# Patient Record
Sex: FEMALE | Race: OTHER | NOT HISPANIC OR LATINO
[De-identification: names, ages, dates, MRNs, and addresses within clinical notes are randomized per-mention and may not be internally consistent; named-entity substitution may affect disease eponyms.]

---

## 2020-04-26 ENCOUNTER — MESSAGE (OUTPATIENT)
Age: 55
End: 2020-04-26

## 2020-05-01 LAB
SARS-COV-2 IGG SERPL IA-ACNC: <0.1 INDEX
SARS-COV-2 IGG SERPL QL IA: NEGATIVE

## 2020-09-10 ENCOUNTER — TRANSCRIPTION ENCOUNTER (OUTPATIENT)
Age: 55
End: 2020-09-10

## 2020-10-08 PROBLEM — Z00.00 ENCOUNTER FOR PREVENTIVE HEALTH EXAMINATION: Status: ACTIVE | Noted: 2020-10-08

## 2021-03-16 PROBLEM — Z86.2 HISTORY OF ANEMIA: Status: RESOLVED | Noted: 2021-03-16 | Resolved: 2021-03-16

## 2021-03-16 PROBLEM — Z83.3 FAMILY HISTORY OF DIABETES MELLITUS: Status: ACTIVE | Noted: 2021-03-16

## 2021-03-16 PROBLEM — Z82.0 FAMILY HISTORY OF ALZHEIMER'S DISEASE: Status: ACTIVE | Noted: 2021-03-16

## 2021-03-16 PROBLEM — Z80.3 FAMILY HISTORY OF MALIGNANT NEOPLASM OF FEMALE BREAST: Status: ACTIVE | Noted: 2021-03-16

## 2021-03-17 ENCOUNTER — APPOINTMENT (OUTPATIENT)
Dept: RADIATION ONCOLOGY | Facility: CLINIC | Age: 56
End: 2021-03-17
Payer: COMMERCIAL

## 2021-03-17 VITALS
DIASTOLIC BLOOD PRESSURE: 85 MMHG | OXYGEN SATURATION: 100 % | SYSTOLIC BLOOD PRESSURE: 149 MMHG | HEART RATE: 100 BPM | WEIGHT: 123 LBS | HEIGHT: 60 IN | TEMPERATURE: 98 F | RESPIRATION RATE: 16 BRPM | BODY MASS INDEX: 24.15 KG/M2

## 2021-03-17 DIAGNOSIS — Z83.3 FAMILY HISTORY OF DIABETES MELLITUS: ICD-10-CM

## 2021-03-17 DIAGNOSIS — Z86.2 PERSONAL HISTORY OF DISEASES OF THE BLOOD AND BLOOD-FORMING ORGANS AND CERTAIN DISORDERS INVOLVING THE IMMUNE MECHANISM: ICD-10-CM

## 2021-03-17 DIAGNOSIS — Z80.3 FAMILY HISTORY OF MALIGNANT NEOPLASM OF BREAST: ICD-10-CM

## 2021-03-17 DIAGNOSIS — Z82.0 FAMILY HISTORY OF EPILEPSY AND OTHER DISEASES OF THE NERVOUS SYSTEM: ICD-10-CM

## 2021-03-17 PROCEDURE — 99072 ADDL SUPL MATRL&STAF TM PHE: CPT

## 2021-03-17 PROCEDURE — 99204 OFFICE O/P NEW MOD 45 MIN: CPT

## 2021-03-17 RX ORDER — MIRTAZAPINE 7.5 MG/1
7.5 TABLET, FILM COATED ORAL
Refills: 0 | Status: DISCONTINUED | COMMUNITY
End: 2021-03-17

## 2021-03-17 RX ORDER — DULOXETINE HYDROCHLORIDE 20 MG/1
20 CAPSULE, DELAYED RELEASE ORAL
Refills: 0 | Status: DISCONTINUED | COMMUNITY
End: 2021-03-17

## 2021-03-20 NOTE — HISTORY OF PRESENT ILLNESS
[FreeTextEntry1] : Mrs. Moni Turner is a 54 yo female who presents today for a newly diagnosed T4bN0 adenocarcinoma of the colon, s/p resection and chemotherapy. \par \par Mrs. Turner went for her annual H&P in 7/2020 and was given stool occult cards. The cards came back positive for occult blood and she was referred for a colonoscopy. On 9/1/2020 a colonoscopy was performed and revealed a tumor at the hepatic flexure which was biopsied and tattooed, as well as a 15 mm polyp at the appendiceal orifice which was also biopsied. Pathologic findings from hepatic flexure biopsy revealed findings highly suspicious for carcinoma possibly mucinous carcinoma. The appendiceal orifice biopsy showed adenomatous mucosa. \par \par On 9/2/2020 CT C/A/P  ( [facility]) revealed large ascending colon mass with extension into the right quadratus lumborum and possibly external oblique muscle with a small component extending through the posterior wall into the subcutaneous fat. No pathologically enlarged lymph nodes were demonstrated by CT criteria. Small mesenteric lymph nodes were demonstrated around the mass. Subcentimeter liver hypodensities were too small to characterize by CT but favored small cysts. An additional hypodensity was demonstrated adjacent to the falciform ligament in the typical location for focal fat.  There were several small nonspecific bilateral pulmonary nodules. \par \par On 9/3/2020 an MRI Abdomen was performed which revealed a few small liver cysts. No suspicious enhancing liver lesions. probable mild focal fatty infiltration liver adjacent to the falciform ligament. There was iron deposition in the liver and spleen, which may be related to multiple transfusions. The known large right ascending colon/hepatic flexure mass was again demonstrated.\par  \par On 9/8/2020 Mrs. Peng underwent a colon resection by Dr Hemphill and Dr. Bloomberg. Pathology revealed; a 7cm well differentiated mucinous adenocarcinoma with tumor extension through the muscularis propria into the pericolonic adipose tissue, there was involvement of the fibrous tissue near the skeletal muscle.  All other margins were negative. There was no evidence of lymphvascular or perineural invasion.  0/35 lymph nodes were positive for metastatic carcinoma.  MMR testing demonstrated intact nuclear expression.  pT4bN0.  She then began adjuvant chemotherapy under the care of Dr. Goldberg on 10/8/2020 with FOLFOX. Her last treatment was on 3/9/2021. She tolerated treatment well but did develop neuropathy in her hands and feet. \par \par Mrs. Liz presents today for consideration of adjuvant radiation to the positive margin.  Her most recent CT of the chest/abdomen/pelvis performed on 3/15/21 revealed no evidence of lesions in the chest/abdomen/pelvis.  There was a 5mm left lower lobe subpleural pulmonary nodule and follow up was recommended.  Mrs. Tariq states that she is feeling well. She denies any pain. Her biggest complaint is that she has neuropathy in both hands and feet. She denies any issues with urination or bowel movement. She has a good appetite and is hydrating well. She is interested in returning to work. \par

## 2021-03-20 NOTE — VITALS
[Maximal Pain Intensity: 0/10] : 0/10 [Least Pain Intensity: 0/10] : 0/10 [NoTreatment Scheduled] : no treatment scheduled [80: Normal activity with effort; some signs or symptoms of disease.] : 80: Normal activity with effort; some signs or symptoms of disease.  [ECOG Performance Status: 1 - Restricted in physically strenuous activity but ambulatory and able to carry out work of a light or sedentary nature] : Performance Status: 1 - Restricted in physically strenuous activity but ambulatory and able to carry out work of a light or sedentary nature, e.g., light house work, office work [Date: ____________] : Patient's last distress assessment performed on [unfilled]. [3 - Distress Level] : Distress Level: 3 [Referred Patient  to social work for follow-up] : Patient was referred to social work for follow-up [FreeTextEntry7] : discussed h & W

## 2021-03-20 NOTE — PHYSICAL EXAM
[Normal] : normal spine exam without palpable tenderness, no kyphosis or scoliosis [de-identified] : patient has a well healed umbilical incision

## 2021-03-20 NOTE — DISEASE MANAGEMENT
[Pathological] : TNM Stage: p [III] : III [FreeTextEntry4] : Mucinous adenocarcinoma of the colon [TTNM] : 4b [NTNM] : 0 [MTNM] : 0

## 2021-03-20 NOTE — LETTER CLOSING
[Consult Closing:] : Thank you for allowing me to participate in the care of this patient.  If you have any questions, please do not hesitate to contact me. [Sincerely yours,] : Sincerely yours, [FreeTextEntry3] : Linda Tanner MD\par

## 2021-04-14 ENCOUNTER — NON-APPOINTMENT (OUTPATIENT)
Age: 56
End: 2021-04-14

## 2021-04-14 VITALS
WEIGHT: 123 LBS | BODY MASS INDEX: 24.15 KG/M2 | RESPIRATION RATE: 14 BRPM | HEART RATE: 96 BPM | HEIGHT: 60 IN | DIASTOLIC BLOOD PRESSURE: 88 MMHG | SYSTOLIC BLOOD PRESSURE: 148 MMHG | OXYGEN SATURATION: 100 % | TEMPERATURE: 98 F

## 2021-04-14 NOTE — REVIEW OF SYSTEMS
[Diarrhea: Grade 0] : Diarrhea: Grade 0 [Dyspepsia: Grade 0] : Dyspepsia: Grade 0 [Dysphagia: Grade 0] : Dysphagia: Grade 0 [Nausea: Grade 0] : Nausea: Grade 0 [Vomiting: Grade 0] : Vomiting: Grade 0 [Fatigue: Grade 0] : Fatigue: Grade 0 [Pruritus: Grade 0] : Pruritus: Grade 0 [Skin Hyperpigmentation: Grade 0] : Skin Hyperpigmentation: Grade 0 [Dermatitis Radiation: Grade 0] : Dermatitis Radiation: Grade 0

## 2021-04-14 NOTE — VITALS
[Maximal Pain Intensity: 0/10] : 0/10 [Least Pain Intensity: 0/10] : 0/10 [NoTreatment Scheduled] : no treatment scheduled [90: Able to carry normal activity; minor signs or symptoms of disease.] : 90: Able to carry normal activity; minor signs or symptoms of disease.  [ECOG Performance Status: 1 - Restricted in physically strenuous activity but ambulatory and able to carry out work of a light or sedentary nature] : Performance Status: 1 - Restricted in physically strenuous activity but ambulatory and able to carry out work of a light or sedentary nature, e.g., light house work, office work [Date: ____________] : Patient's last distress assessment performed on [unfilled].

## 2021-04-15 NOTE — HISTORY OF PRESENT ILLNESS
[FreeTextEntry1] : Mrs. Moni Turner is a 54 yo female with a T4bN0 adenocarcinoma of the colon, s/p resection and chemotherapy.\par Mrs. Turner presents today for an OTV, completed 3/25 fractions to a dose of 600 cGy to the abdomen/pelvis. She is overall feeling well. She denies and GI or  issues. She states her appetite is good. She is using Calendula cream BID. She is working full time.

## 2021-04-21 ENCOUNTER — NON-APPOINTMENT (OUTPATIENT)
Age: 56
End: 2021-04-21

## 2021-04-21 VITALS
BODY MASS INDEX: 23.95 KG/M2 | TEMPERATURE: 98 F | RESPIRATION RATE: 16 BRPM | SYSTOLIC BLOOD PRESSURE: 142 MMHG | HEART RATE: 88 BPM | DIASTOLIC BLOOD PRESSURE: 88 MMHG | WEIGHT: 122 LBS | OXYGEN SATURATION: 99 % | HEIGHT: 60 IN

## 2021-04-21 NOTE — DISEASE MANAGEMENT
[Pathological] : TNM Stage: p [III] : III [FreeTextEntry4] : Mucinous adenocarcinoma of the colon [TTNM] : 4b [NTNM] : 0 [MTNM] : 0 [de-identified] : completed 8/25 fractions to the Abdomen/pelvis to a dose of 1000 cGy

## 2021-04-21 NOTE — REVIEW OF SYSTEMS
[Diarrhea: Grade 0] : Diarrhea: Grade 0 [Dyspepsia: Grade 0] : Dyspepsia: Grade 0 [Dysphagia: Grade 0] : Dysphagia: Grade 0 [Nausea: Grade 0] : Nausea: Grade 0 [Vomiting: Grade 0] : Vomiting: Grade 0 [Fatigue: Grade 0] : Fatigue: Grade 0 [Peripheral Motor Neuropathy: Grade 1 - Asymptomatic; clinical or diagnostic observations only; intervention not indicated] : Peripheral Motor Neuropathy: Grade 1 - Asymptomatic; clinical or diagnostic observations only; intervention not indicated [Peripheral Sensory Neuropathy: Grade 1 - Asymptomatic; loss of deep tendon reflexes or paresthesia] : Peripheral Sensory Neuropathy: Grade 1 - Asymptomatic; loss of deep tendon reflexes or paresthesia [Pruritus: Grade 0] : Pruritus: Grade 0 [Skin Hyperpigmentation: Grade 0] : Skin Hyperpigmentation: Grade 0 [Dermatitis Radiation: Grade 0] : Dermatitis Radiation: Grade 0

## 2021-04-21 NOTE — VITALS
[Maximal Pain Intensity: 0/10] : 0/10 [Least Pain Intensity: 0/10] : 0/10 [NoTreatment Scheduled] : no treatment scheduled [90: Able to carry normal activity; minor signs or symptoms of disease.] : 90: Able to carry normal activity; minor signs or symptoms of disease.  [ECOG Performance Status: 1 - Restricted in physically strenuous activity but ambulatory and able to carry out work of a light or sedentary nature] : Performance Status: 1 - Restricted in physically strenuous activity but ambulatory and able to carry out work of a light or sedentary nature, e.g., light house work, office work [Date: ____________] : Patient's last distress assessment performed on [unfilled]. [5 - Distress Level] : Distress Level: 5 [FreeTextEntry7] : Discussed H&W and she will think about it and let us know

## 2021-04-28 ENCOUNTER — NON-APPOINTMENT (OUTPATIENT)
Age: 56
End: 2021-04-28

## 2021-04-28 VITALS
BODY MASS INDEX: 24.15 KG/M2 | HEART RATE: 84 BPM | SYSTOLIC BLOOD PRESSURE: 128 MMHG | OXYGEN SATURATION: 99 % | RESPIRATION RATE: 16 BRPM | DIASTOLIC BLOOD PRESSURE: 78 MMHG | HEIGHT: 60 IN | TEMPERATURE: 98 F | WEIGHT: 123 LBS

## 2021-04-28 NOTE — DISEASE MANAGEMENT
[Pathological] : TNM Stage: p [III] : III [FreeTextEntry4] : Mucinous adenocarcinoma of the colon [TTNM] : 4b [MTNM] : 0 [NTNM] : 0 [de-identified] : completed 13/25 fractions to the Abdomen/pelvis to a dose of 2600 cGy

## 2021-04-28 NOTE — REVIEW OF SYSTEMS
[Diarrhea: Grade 0] : Diarrhea: Grade 0 [Dyspepsia: Grade 0] : Dyspepsia: Grade 0 [Dysphagia: Grade 0] : Dysphagia: Grade 0 [Nausea: Grade 0] : Nausea: Grade 0 [Vomiting: Grade 0] : Vomiting: Grade 0 [Fatigue: Grade 0] : Fatigue: Grade 0 [Peripheral Motor Neuropathy: Grade 1 - Asymptomatic; clinical or diagnostic observations only; intervention not indicated] : Peripheral Motor Neuropathy: Grade 1 - Asymptomatic; clinical or diagnostic observations only; intervention not indicated [Peripheral Sensory Neuropathy: Grade 1 - Asymptomatic; loss of deep tendon reflexes or paresthesia] : Peripheral Sensory Neuropathy: Grade 1 - Asymptomatic; loss of deep tendon reflexes or paresthesia [Pruritus: Grade 0] : Pruritus: Grade 0 [Skin Hyperpigmentation: Grade 1 - Hyperpigmentation covering <10% BSA; no psychosocial impact] : Skin Hyperpigmentation: Grade 1 - Hyperpigmentation covering <10% BSA; no psychosocial impact [Dermatitis Radiation: Grade 0] : Dermatitis Radiation: Grade 0

## 2021-04-28 NOTE — VITALS
[Maximal Pain Intensity: 0/10] : 0/10 [NoTreatment Scheduled] : no treatment scheduled [Least Pain Intensity: 0/10] : 0/10 [90: Able to carry normal activity; minor signs or symptoms of disease.] : 90: Able to carry normal activity; minor signs or symptoms of disease.  [ECOG Performance Status: 1 - Restricted in physically strenuous activity but ambulatory and able to carry out work of a light or sedentary nature] : Performance Status: 1 - Restricted in physically strenuous activity but ambulatory and able to carry out work of a light or sedentary nature, e.g., light house work, office work [Date: ____________] : Patient's last distress assessment performed on [unfilled]. [5 - Distress Level] : Distress Level: 5 [FreeTextEntry7] : Discussed H&W and she will think about it and let us know

## 2021-04-28 NOTE — HISTORY OF PRESENT ILLNESS
[FreeTextEntry1] : Mrs. Moni Turner is a 54 yo female with a T4bN0 adenocarcinoma of the colon, s/p resection and chemotherapy.\par Mrs. Turner presents today for an OTV, completed 13/25 fractions to a dose of 2600 cGy to the abdomen/pelvis. She is overall feeling well. She denies and GI or  issues. She states her appetite is good and denies any n/v.  She is using Calendula cream BID. Her skin is slightly hyperpigmented. She is still having issues with the neuropathy, She will be trying some H & W services.  She continues to work full time and notes fatigue.

## 2021-05-05 ENCOUNTER — NON-APPOINTMENT (OUTPATIENT)
Age: 56
End: 2021-05-05

## 2021-05-05 NOTE — DISEASE MANAGEMENT
[Pathological] : TNM Stage: p [III] : III [FreeTextEntry4] : Mucinous adenocarcinoma of the colon [TTNM] : 4b [MTNM] : 0 [NTNM] : 0 [de-identified] : completed 18/25 fractions to the Abdomen/pelvis to a dose of 3600 cGy

## 2021-05-05 NOTE — HISTORY OF PRESENT ILLNESS
[FreeTextEntry1] : Mrs. Moni Turner is a 54 yo female with a T4bN0 adenocarcinoma of the colon, s/p resection and chemotherapy.\par Mrs. Turner presents today for an OTV, completed 18/25 fractions to a dose of 3600 cGy to the abdomen/pelvis. She is overall feeling well. She denies and GI or  issues. She states her appetite is good and denies any n/v.  She is using Calendula cream BID. Her skin is slightly hyperpigmented. She has made an appointment to try the H & W services.

## 2021-05-12 ENCOUNTER — NON-APPOINTMENT (OUTPATIENT)
Age: 56
End: 2021-05-12

## 2021-06-30 ENCOUNTER — APPOINTMENT (OUTPATIENT)
Dept: RADIATION ONCOLOGY | Facility: CLINIC | Age: 56
End: 2021-06-30
Payer: COMMERCIAL

## 2021-06-30 VITALS
WEIGHT: 122 LBS | HEIGHT: 60 IN | TEMPERATURE: 98 F | OXYGEN SATURATION: 96 % | BODY MASS INDEX: 23.95 KG/M2 | SYSTOLIC BLOOD PRESSURE: 115 MMHG | DIASTOLIC BLOOD PRESSURE: 75 MMHG | RESPIRATION RATE: 16 BRPM | HEART RATE: 84 BPM

## 2021-06-30 PROCEDURE — 99024 POSTOP FOLLOW-UP VISIT: CPT

## 2021-06-30 NOTE — REVIEW OF SYSTEMS
[Peripheral Motor Neuropathy: Grade 2 - Moderate symptoms; limiting instrumental ADL] : Peripheral Motor Neuropathy: Grade 2 - Moderate symptoms; limiting instrumental ADL [Skin Hyperpigmentation: Grade 0] : Skin Hyperpigmentation: Grade 0 [Dermatitis Radiation: Grade 0] : Dermatitis Radiation: Grade 0

## 2021-06-30 NOTE — VITALS
[NoTreatment Scheduled] : no treatment scheduled [Maximal Pain Intensity: 3/10] : 3/10 [Least Pain Intensity: 3/10] : 3/10 [80: Normal activity with effort; some signs or symptoms of disease.] : 80: Normal activity with effort; some signs or symptoms of disease.  [ECOG Performance Status: 1 - Restricted in physically strenuous activity but ambulatory and able to carry out work of a light or sedentary nature] : Performance Status: 1 - Restricted in physically strenuous activity but ambulatory and able to carry out work of a light or sedentary nature, e.g., light house work, office work

## 2021-07-01 NOTE — HISTORY OF PRESENT ILLNESS
[FreeTextEntry1] : Mrs. Moni Turner is a 55 year old female, who presents today for a T4bN0 adenocarcinoma of the colon, s/p resection and chemotherapy followed by adjuvant radiation therapy. \par \par Mrs. Turner went for her annual H&P in 7/2020 and had positive for occult blood in stool.  On 9/1/2020, a colonoscopy was performed and revealed a tumor at the hepatic flexure, which was biopsied and tattooed, as well as a 15 mm polyp at the appendiceal orifice which was also biopsied. Pathologic findings from hepatic flexure biopsy revealed findings highly suspicious for carcinoma possibly mucinous carcinoma. The appendiceal orifice biopsy showed adenomatous mucosa. \par \par On 9/2/2020 CT C/A/P  ( [facility]) revealed large ascending colon mass with extension into the right quadratus lumborum and possibly external oblique muscle with a small component extending through the posterior wall into the subcutaneous fat. No pathologically enlarged lymph nodes were demonstrated by CT criteria. Small mesenteric lymph nodes were demonstrated around the mass. Subcentimeter liver hypodensities were too small to characterize by CT but favored small cysts. An additional hypodensity was demonstrated adjacent to the falciform ligament in the typical location for focal fat.  There were several small nonspecific bilateral pulmonary nodules. \par \par On 9/3/2020 an MRI Abdomen was performed which revealed a few small liver cysts. No suspicious enhancing liver lesions. probable mild focal fatty infiltration liver adjacent to the falciform ligament. There was iron deposition in the liver and spleen, which may be related to multiple transfusions. The known large right ascending colon/hepatic flexure mass was again demonstrated.\par  \par On 9/8/2020, Mrs. Peng underwent a colon resection by Dr Hemphill and Dr. Bloomberg. Surgical pathology revealed a 7 cm well differentiated mucinous adenocarcinoma with tumor extension through the muscularis propria into the pericolonic adipose tissue, there was involvement of the fibrous tissue near the skeletal muscle.  All other margins were negative. There was no evidence of lymphvascular or perineural invasion.  0/35 lymph nodes were positive for metastatic carcinoma.  MMR testing demonstrated intact nuclear expression. Pathological staging: pT4bN0.  She began adjuvant chemotherapy under the care of Dr. Goldberg on 10/8/2020 with FOLFOX. Her last treatment was on 3/9/2021. She tolerated treatment well but did develop neuropathy in her hands and feet. \par \par CT of the chest/abdomen/pelvis performed on 3/15/21 revealed no evidence of lesions in the chest/abdomen/pelvis.  There was a 5mm left lower lobe subpleural pulmonary nodule and follow up was recommended. \par \par Mrs. Liz presents today for a PTE She completed adjuvant radiation to the positive margin on 5/14/2021, 25 fractions  to a total dose of 5000 cGy.  She states that her neuropathy is improving. She saw Dr. Goldberg last week and is scheduled for a CT A/P on 7/15/2021 because she is c/o numbness and the occasional pain. She states her BM's are normal denies any constipation or diarrhea. Her skin looks good She has a good appetite. She states that her pain scale today is a 3/10 and she is not taking anything for the pain. \par

## 2021-07-01 NOTE — PHYSICAL EXAM
[Normal] : normal spine exam without palpable tenderness, no kyphosis or scoliosis [de-identified] : well healed umbilical incision

## 2021-07-01 NOTE — END OF VISIT
[Time Spent: ___ minutes] : I have spent [unfilled] minutes of time on the encounter. Simple: Patient demonstrates quick and easy understanding

## 2021-11-23 ENCOUNTER — APPOINTMENT (OUTPATIENT)
Dept: RADIATION ONCOLOGY | Facility: CLINIC | Age: 56
End: 2021-11-23
Payer: COMMERCIAL

## 2021-11-23 VITALS
HEART RATE: 75 BPM | SYSTOLIC BLOOD PRESSURE: 117 MMHG | HEIGHT: 60 IN | TEMPERATURE: 98 F | DIASTOLIC BLOOD PRESSURE: 76 MMHG | OXYGEN SATURATION: 100 % | RESPIRATION RATE: 16 BRPM

## 2021-11-23 VITALS — WEIGHT: 140 LBS | BODY MASS INDEX: 27.34 KG/M2

## 2021-11-23 PROCEDURE — 99214 OFFICE O/P EST MOD 30 MIN: CPT

## 2021-11-23 NOTE — HISTORY OF PRESENT ILLNESS
[FreeTextEntry1] : Mrs. Moni Turner is a 55 year old female with Stage IIC (pT4bN0) adenocarcinoma of the colon, s/p resection followed by adjuvant chemotherapy and radiation therapy. \par \par Mrs. Turner went for her annual H&P in 7/2020 and had positive for occult blood in stool.  \par \par On 9/1/2020, a colonoscopy was performed and revealed a tumor at the hepatic flexure, which was biopsied and tattooed, as well as a 15 mm polyp at the appendiceal orifice which was also biopsied. Pathologic findings from hepatic flexure biopsy revealed findings highly suspicious for carcinoma, possibly mucinous carcinoma. The appendiceal orifice biopsy showed adenomatous mucosa. \par \par On 9/2/2020 CT C/A/P revealed large ascending colon mass with extension into the right quadratus lumborum and possibly external oblique muscle with a small component extending through the posterior wall into the subcutaneous fat. No pathologically enlarged lymph nodes were demonstrated by CT criteria. Small mesenteric lymph nodes were demonstrated around the mass. Subcentimeter liver hypodensities were too small to characterize by CT but favored small cysts. An additional hypodensity was demonstrated adjacent to the falciform ligament in the typical location for focal fat.  There were several small nonspecific bilateral pulmonary nodules. \par \par On 9/3/2020 an MRI Abdomen was performed which revealed a few small liver cysts. No suspicious enhancing liver lesions. probable mild focal fatty infiltration liver adjacent to the falciform ligament. There was iron deposition in the liver and spleen, which may be related to multiple transfusions. The known large right ascending colon/hepatic flexure mass was again demonstrated.\par  \par On 9/8/2020, Mrs. Peng underwent a colon resection by Dr Hemphill and Dr. Bloomberg. Surgical pathology revealed a 7 cm well differentiated mucinous adenocarcinoma with tumor extension through the muscularis propria into the pericolonic adipose tissue, there was involvement of the fibrous tissue near the skeletal muscle.  All other margins were negative. There was no evidence of lymphvascular or perineural invasion.  0/35 lymph nodes were positive for metastatic carcinoma.  MMR testing demonstrated intact nuclear expression. Pathological staging: pT4bN0.  \par \par She began adjuvant chemotherapy under the care of Dr. Goldberg on 10/8/2020 with FOLFOX. Her last treatment was on 3/9/2021. She tolerated treatment well but did develop neuropathy in her hands and feet. \par \par CT of the chest/abdomen/pelvis performed on 3/15/21 revealed no evidence of lesions in the chest/abdomen/pelvis.  There was a 5mm left lower lobe subpleural pulmonary nodule and follow up was recommended. \par \par She completed adjuvant radiation to the positive margin on 5/14/2021, 25 fractions  to a total dose of 5000 cGy.  \par \par 7/15/2021 CT A/P revealed stable exam since 3//2021. Mild stable infiltration of the right posterior paracolic and extraperitoneal fat with enlargement of te right iliacus muscle. Suspect postoperative changes, however residual neoplasm not definitively excluded.  \par \par She saw Jon Goldberg on 9/23/2021 who plans to obtain CEA levels every 3 months and a CT scan every 6 months, she is due in 12/2021. She had a colonoscopy last week and everything was good she will repeat in 3 years. \par \par 11/16/2021 She underwent a colonoscopy by Dr. Gayle impression: patient ends - to -side ileo- colonic anastomosis characterized by healthy appearing mucosa. External hemorrhoids. There was an evidence of a prior end to side ileo colonic anastomosis in the mid transverse colon. This was patent and was characterized by healthy appearing mucosa. The anastomosis was traversed. \par \par 11/23/2021\par She states that she has some numbness and discomfort in the right hip area. She is currently taking Gabapentin for her neuropathy which has improved greatly. Appetite good. Bowels are good and urinating well. Denies any n/v\par \par

## 2021-11-23 NOTE — REVIEW OF SYSTEMS
[Nausea: Grade 0] : Nausea: Grade 0 [Vomiting: Grade 0] : Vomiting: Grade 0 [Fatigue: Grade 0] : Fatigue: Grade 0 [Peripheral Motor Neuropathy: Grade 0] : Peripheral Motor Neuropathy: Grade 0 [Peripheral Sensory Neuropathy: Grade 0] : Peripheral Sensory Neuropathy: Grade 0 [Negative] : Allergic/Immunologic [de-identified] : Numbness right lower abdominal wall at the site of prior surgery [Constipation: Grade 0] : Constipation: Grade 0 [Diarrhea: Grade 0] : Diarrhea: Grade 0

## 2021-11-23 NOTE — DISEASE MANAGEMENT
[Pathological] : TNM Stage: p [FreeTextEntry4] : Mucinous adenocarcinoma of the colon [TTNM] : 4b [NTNM] : 0 [MTNM] : 0 [IIC] : IIC

## 2022-05-24 ENCOUNTER — APPOINTMENT (OUTPATIENT)
Dept: RADIATION ONCOLOGY | Facility: CLINIC | Age: 57
End: 2022-05-24
Payer: COMMERCIAL

## 2022-05-24 PROCEDURE — 99214 OFFICE O/P EST MOD 30 MIN: CPT

## 2022-05-24 NOTE — HISTORY OF PRESENT ILLNESS
[FreeTextEntry1] : Mrs. Moni Turner is a 55 year old female with Stage IIC (pT4bN0) adenocarcinoma of the colon, s/p resection followed by adjuvant chemotherapy and radiation therapy. \par \par Mrs. Turner went for her annual H&P in 7/2020 and had positive for occult blood in stool.  \par \par On 9/1/2020, a colonoscopy was performed and revealed a tumor at the hepatic flexure, which was biopsied and tattooed, as well as a 15 mm polyp at the appendiceal orifice which was also biopsied. Pathologic findings from hepatic flexure biopsy revealed findings highly suspicious for carcinoma, possibly mucinous carcinoma. The appendiceal orifice biopsy showed adenomatous mucosa. \par \par On 9/2/2020 CT C/A/P revealed large ascending colon mass with extension into the right quadratus lumborum and possibly external oblique muscle with a small component extending through the posterior wall into the subcutaneous fat. No pathologically enlarged lymph nodes were demonstrated by CT criteria. Small mesenteric lymph nodes were demonstrated around the mass. Subcentimeter liver hypodensities were too small to characterize by CT but favored small cysts. An additional hypodensity was demonstrated adjacent to the falciform ligament in the typical location for focal fat.  There were several small nonspecific bilateral pulmonary nodules. \par \par On 9/3/2020 an MRI Abdomen was performed which revealed a few small liver cysts. No suspicious enhancing liver lesions. probable mild focal fatty infiltration liver adjacent to the falciform ligament. There was iron deposition in the liver and spleen, which may be related to multiple transfusions. The known large right ascending colon/hepatic flexure mass was again demonstrated.\par  \par On 9/8/2020, Mrs. Peng underwent a colon resection by Dr Hemphill and Dr. Bloomberg. Surgical pathology revealed a 7 cm well differentiated mucinous adenocarcinoma with tumor extension through the muscularis propria into the pericolonic adipose tissue, there was involvement of the fibrous tissue near the skeletal muscle.  All other margins were negative. There was no evidence of lymphvascular or perineural invasion.  0/35 lymph nodes were positive for metastatic carcinoma.  MMR testing demonstrated intact nuclear expression. Pathological staging: pT4bN0.  \par \par She began adjuvant chemotherapy under the care of Dr. Goldberg on 10/8/2020 with FOLFOX. Her last treatment was on 3/9/2021. She tolerated treatment well but did develop neuropathy in her hands and feet. \par \par CT of the chest/abdomen/pelvis performed on 3/15/21 revealed no evidence of lesions in the chest/abdomen/pelvis.  There was a 5mm left lower lobe subpleural pulmonary nodule and follow up was recommended. \par \par She completed adjuvant radiation to the positive margin on 5/14/2021, 25 fractions  to a total dose of 5000 cGy.  \par \par 7/15/2021 CT A/P revealed stable exam since 3//2021. Mild stable infiltration of the right posterior paracolic and extraperitoneal fat with enlargement of te right iliacus muscle. Suspect postoperative changes, however residual neoplasm not definitively excluded.  \par \par She saw Jon Goldberg on 9/23/2021 who plans to obtain CEA levels every 3 months and a CT scan every 6 months, she is due in 12/2021. She had a colonoscopy last week and everything was good she will repeat in 3 years. \par \par 11/16/2021 She underwent a colonoscopy by Dr. Gayle impression: patient ends - to -side ileo- colonic anastomosis characterized by healthy appearing mucosa. External hemorrhoids. There was an evidence of a prior end to side ileo colonic anastomosis in the mid transverse colon. This was patent and was characterized by healthy appearing mucosa. The anastomosis was traversed. \par \par 11/23/2021\par She states that she has some numbness and discomfort in the right hip area. She is currently taking Gabapentin for her neuropathy which has improved greatly. Appetite good. Bowels are good and urinating well. Denies any n/v\par \par 12/27/2021\par CT chest, abdomen, and pelvis reveled "No significant change from the prior study dated 7/15/2021"\par \par 5/24/2022\par Patient presents today for follow-up. CEA 0.09 ng/ml on 4/2/2022. Last imaging was on 12/27/2021: CT chest, abdomen, and pelvis showed no evidence of recurrent disease.\par \par The patient feels well today. She continues with neuropathy in her hands and feet and reports an area of numbness in her right lower abdominal area since the surgery. She has no new concerns today.

## 2022-05-24 NOTE — REVIEW OF SYSTEMS
[Negative] : Allergic/Immunologic [Constipation: Grade 0] : Constipation: Grade 0 [Diarrhea: Grade 0] : Diarrhea: Grade 0 [Fatigue: Grade 0] : Fatigue: Grade 0 [de-identified] : Numbness right lower abdominal wall at the site of prior surgery [Small Intestinal Obstruction: Grade 0] : Small Intestinal Obstruction: Grade 0 [Peripheral Motor Neuropathy: Grade 1 - Asymptomatic; clinical or diagnostic observations only; intervention not indicated] : Peripheral Motor Neuropathy: Grade 1 - Asymptomatic; clinical or diagnostic observations only; intervention not indicated [Peripheral Sensory Neuropathy: Grade 1 - Asymptomatic; loss of deep tendon reflexes or paresthesia] : Peripheral Sensory Neuropathy: Grade 1 - Asymptomatic; loss of deep tendon reflexes or paresthesia

## 2022-05-24 NOTE — PHYSICAL EXAM
[] : no respiratory distress [Supraclavicular Lymph Nodes Enlarged Bilaterally] : supraclavicular [Axillary Lymph Nodes Enlarged Bilaterally] : axillary [Normal] : normal skin color and pigmentation and no rash [de-identified] : Numbness right lower quadrant of the abdominal wall

## 2022-05-24 NOTE — DISEASE MANAGEMENT
[Pathological] : TNM Stage: p [IIC] : IIC [FreeTextEntry4] : Mucinous adenocarcinoma of the colon [TTNM] : 4b [NTNM] : 0 [MTNM] : 0

## 2022-11-29 ENCOUNTER — APPOINTMENT (OUTPATIENT)
Dept: RADIATION ONCOLOGY | Facility: CLINIC | Age: 57
End: 2022-11-29

## 2022-11-30 ENCOUNTER — APPOINTMENT (OUTPATIENT)
Dept: RADIATION ONCOLOGY | Facility: CLINIC | Age: 57
End: 2022-11-30

## 2022-11-30 VITALS
BODY MASS INDEX: 27.48 KG/M2 | OXYGEN SATURATION: 98 % | HEART RATE: 81 BPM | WEIGHT: 140 LBS | SYSTOLIC BLOOD PRESSURE: 99 MMHG | HEIGHT: 60 IN | RESPIRATION RATE: 16 BRPM | DIASTOLIC BLOOD PRESSURE: 63 MMHG

## 2022-11-30 PROCEDURE — 99213 OFFICE O/P EST LOW 20 MIN: CPT

## 2022-11-30 RX ORDER — DULOXETINE HYDROCHLORIDE 60 MG/1
60 CAPSULE, DELAYED RELEASE PELLETS ORAL
Qty: 90 | Refills: 0 | Status: ACTIVE | COMMUNITY
Start: 2022-09-07

## 2022-11-30 RX ORDER — DULOXETINE HYDROCHLORIDE 30 MG/1
30 CAPSULE, DELAYED RELEASE PELLETS ORAL
Qty: 7 | Refills: 0 | Status: COMPLETED | COMMUNITY
Start: 2022-09-07

## 2022-11-30 NOTE — DISEASE MANAGEMENT
[FreeTextEntry4] : Mucinous adenocarcinoma of the colon [TTNM] : 4b [NTNM] : 0 [MTNM] : 0 [de-identified] : pelvis  25 Fxs 50 GY completed 5/21/2022

## 2022-11-30 NOTE — DISEASE MANAGEMENT
[FreeTextEntry4] : Mucinous adenocarcinoma of the colon [TTNM] : 4b [NTNM] : 0 [MTNM] : 0 [de-identified] : pelvis  25 Fxs 50 GY completed 5/21/2022

## 2022-11-30 NOTE — PHYSICAL EXAM
[Sclera] : the sclera and conjunctiva were normal [PERRL With Normal Accommodation] : pupils were equal in size, round, reactive to light [] : no respiratory distress [Exaggerated Use Of Accessory Muscles For Inspiration] : no accessory muscle use [Normal] : oriented to person, place and time, the affect was normal, the mood was normal and not anxious [Abdomen Soft] : soft [Nondistended] : nondistended [Abdomen Tenderness] : non-tender [No Focal Deficits] : no focal deficits

## 2022-11-30 NOTE — HISTORY OF PRESENT ILLNESS
[FreeTextEntry1] : Mrs. Moni Turner is a 55 year old female with Stage IIC (pT4bN0) adenocarcinoma of the colon, s/p resection followed by adjuvant chemotherapy and radiation therapy. \par \par Mrs. Turner went for her annual H&P in 7/2020 and had positive for occult blood in stool.  \par \par On 9/1/2020, a colonoscopy was performed and revealed a tumor at the hepatic flexure, which was biopsied and tattooed, as well as a 15 mm polyp at the appendiceal orifice which was also biopsied. Pathologic findings from hepatic flexure biopsy revealed findings highly suspicious for carcinoma, possibly mucinous carcinoma. The appendiceal orifice biopsy showed adenomatous mucosa. \par \par On 9/2/2020 CT C/A/P revealed large ascending colon mass with extension into the right quadratus lumborum and possibly external oblique muscle with a small component extending through the posterior wall into the subcutaneous fat. No pathologically enlarged lymph nodes were demonstrated by CT criteria. Small mesenteric lymph nodes were demonstrated around the mass. Subcentimeter liver hypodensities were too small to characterize by CT but favored small cysts. An additional hypodensity was demonstrated adjacent to the falciform ligament in the typical location for focal fat.  There were several small nonspecific bilateral pulmonary nodules. \par \par On 9/3/2020 an MRI Abdomen was performed which revealed a few small liver cysts. No suspicious enhancing liver lesions. probable mild focal fatty infiltration liver adjacent to the falciform ligament. There was iron deposition in the liver and spleen, which may be related to multiple transfusions. The known large right ascending colon/hepatic flexure mass was again demonstrated.\par  \par On 9/8/2020, Mrs. Peng underwent a colon resection by Dr Hemphill and Dr. Bloomberg. Surgical pathology revealed a 7 cm well differentiated mucinous adenocarcinoma with tumor extension through the muscularis propria into the pericolonic adipose tissue, there was involvement of the fibrous tissue near the skeletal muscle.  All other margins were negative. There was no evidence of lymphvascular or perineural invasion.  0/35 lymph nodes were positive for metastatic carcinoma.  MMR testing demonstrated intact nuclear expression. Pathological staging: pT4bN0.  \par \par She began adjuvant chemotherapy under the care of Dr. Goldberg on 10/8/2020 with FOLFOX. Her last treatment was on 3/9/2021. She tolerated treatment well but did develop neuropathy in her hands and feet. \par \par CT of the chest/abdomen/pelvis performed on 3/15/21 revealed no evidence of lesions in the chest/abdomen/pelvis.  There was a 5mm left lower lobe subpleural pulmonary nodule and follow up was recommended. \par \par She completed adjuvant radiation to the positive margin on 5/14/2021, 25 fractions  to a total dose of 5000 cGy.  \par \par 7/15/2021 CT A/P revealed stable exam since 3//2021. Mild stable infiltration of the right posterior paracolic and extraperitoneal fat with enlargement of te right iliacus muscle. Suspect postoperative changes, however residual neoplasm not definitively excluded.  \par \par She saw Jon Goldberg on 9/23/2021 who plans to obtain CEA levels every 3 months and a CT scan every 6 months, she is due in 12/2021. She had a colonoscopy last week and everything was good she will repeat in 3 years. \par \par 11/16/2021 She underwent a colonoscopy by Dr. Gayle impression: patient ends - to -side ileo- colonic anastomosis characterized by healthy appearing mucosa. External hemorrhoids. There was an evidence of a prior end to side ileo colonic anastomosis in the mid transverse colon. This was patent and was characterized by healthy appearing mucosa. The anastomosis was traversed. \par \par 11/23/2021\par She states that she has some numbness and discomfort in the right hip area. She is currently taking Gabapentin for her neuropathy which has improved greatly. Appetite good. Bowels are good and urinating well. Denies any n/v\par \par 12/27/2021\par CT chest, abdomen, and pelvis reveled "No significant change from the prior study dated 7/15/2021"\par \par 5/24/2022\par Patient presents today for follow-up. CEA 0.09 ng/ml on 4/2/2022. Last imaging was on 12/27/2021: CT chest, abdomen, and pelvis showed no evidence of recurrent disease.\par \par The patient feels well today. She continues with neuropathy in her hands and feet and reports an area of numbness in her right lower abdominal area since the surgery. She has no new concerns today.\par \par 11/29/2022\par The patient is here for 6 month f/u. \par CEA done 11/13/2022 was 1.05\par \par CT of Abdomen and pelvis from 8/16/2022 showed:\par Stable evaluation.   No evidence of local or distant metastasis.\par \par today she is feeling well.  She denies pain and reports no bowel or bladder issues.  She still has some neuropathy since chemo and takes gabapentin for this.  She will see Dr Goldberg In January for scans.

## 2023-11-21 ENCOUNTER — APPOINTMENT (OUTPATIENT)
Dept: RADIATION ONCOLOGY | Facility: CLINIC | Age: 58
End: 2023-11-21
Payer: COMMERCIAL

## 2023-11-21 VITALS
BODY MASS INDEX: 26.56 KG/M2 | RESPIRATION RATE: 16 BRPM | WEIGHT: 136 LBS | HEART RATE: 86 BPM | DIASTOLIC BLOOD PRESSURE: 82 MMHG | OXYGEN SATURATION: 98 % | SYSTOLIC BLOOD PRESSURE: 119 MMHG

## 2023-11-21 DIAGNOSIS — C18.9 MALIGNANT NEOPLASM OF COLON, UNSPECIFIED: ICD-10-CM

## 2023-11-21 PROCEDURE — 99213 OFFICE O/P EST LOW 20 MIN: CPT

## 2023-11-21 RX ORDER — GABAPENTIN 100 MG/1
CAPSULE ORAL
Refills: 0 | Status: DISCONTINUED | COMMUNITY
End: 2023-11-21

## 2024-11-19 ENCOUNTER — APPOINTMENT (OUTPATIENT)
Dept: RADIATION ONCOLOGY | Facility: CLINIC | Age: 59
End: 2024-11-19
Payer: COMMERCIAL

## 2024-11-19 VITALS
DIASTOLIC BLOOD PRESSURE: 71 MMHG | RESPIRATION RATE: 16 BRPM | OXYGEN SATURATION: 98 % | SYSTOLIC BLOOD PRESSURE: 105 MMHG | HEART RATE: 77 BPM

## 2024-11-19 DIAGNOSIS — C18.9 MALIGNANT NEOPLASM OF COLON, UNSPECIFIED: ICD-10-CM

## 2024-11-19 PROCEDURE — G2211 COMPLEX E/M VISIT ADD ON: CPT

## 2024-11-19 PROCEDURE — 99213 OFFICE O/P EST LOW 20 MIN: CPT

## 2024-12-12 NOTE — DISEASE MANAGEMENT
Patient going away for the winter and wanting to know if there's anyway she can r/s her appt 2/19/25 with  to May 2025 instead. If not she said she'll just keep her appt on 2/19/25 and she'll just have to come back for the appt.   [Pathological] : TNM Stage: p [III] : III [FreeTextEntry4] : Mucinous adenocarcinoma of the colon [TTNM] : 4b [NTNM] : 0 [MTNM] : 0 [de-identified] : completed 3/25 fractions to the Abdomen/pelvis to a dose of 600 cGy